# Patient Record
Sex: MALE | Race: BLACK OR AFRICAN AMERICAN
[De-identification: names, ages, dates, MRNs, and addresses within clinical notes are randomized per-mention and may not be internally consistent; named-entity substitution may affect disease eponyms.]

---

## 2017-12-10 ENCOUNTER — HOSPITAL ENCOUNTER (EMERGENCY)
Dept: HOSPITAL 53 - M ED | Age: 22
Discharge: HOME | End: 2017-12-10
Payer: OTHER GOVERNMENT

## 2017-12-10 VITALS — WEIGHT: 174.36 LBS | BODY MASS INDEX: 26.43 KG/M2 | HEIGHT: 68 IN

## 2017-12-10 VITALS — SYSTOLIC BLOOD PRESSURE: 133 MMHG | DIASTOLIC BLOOD PRESSURE: 77 MMHG

## 2017-12-10 DIAGNOSIS — M54.5: Primary | ICD-10-CM

## 2017-12-10 DIAGNOSIS — F17.210: ICD-10-CM

## 2017-12-10 LAB
ALBUMIN SERPL BCG-MCNC: 4 GM/DL (ref 3.2–5.2)
ALBUMIN/GLOB SERPL: 1.18 {RATIO} (ref 1–1.93)
ALP SERPL-CCNC: 88 U/L (ref 45–117)
ALT SERPL W P-5'-P-CCNC: 33 U/L (ref 12–78)
ANION GAP SERPL CALC-SCNC: 6 MEQ/L (ref 8–16)
AST SERPL-CCNC: 20 U/L (ref 7–37)
BASOPHILS # BLD AUTO: 0 10^3/UL (ref 0–0.2)
BASOPHILS NFR BLD AUTO: 0.3 % (ref 0–1)
BILIRUB CONJ SERPL-MCNC: 0.2 MG/DL (ref 0–0.2)
BILIRUB SERPL-MCNC: 0.9 MG/DL (ref 0.2–1)
BUN SERPL-MCNC: 14 MG/DL (ref 7–18)
CALCIUM SERPL-MCNC: 9.1 MG/DL (ref 8.5–10.1)
CHLORIDE SERPL-SCNC: 104 MEQ/L (ref 98–107)
CO2 SERPL-SCNC: 33 MEQ/L (ref 21–32)
CREAT SERPL-MCNC: 1.02 MG/DL (ref 0.7–1.3)
EOSINOPHIL # BLD AUTO: 0.1 10^3/UL (ref 0–0.5)
EOSINOPHIL NFR BLD AUTO: 2.1 % (ref 0–3)
ERYTHROCYTE [DISTWIDTH] IN BLOOD BY AUTOMATED COUNT: 13 % (ref 11.5–14.5)
GFR SERPL CREATININE-BSD FRML MDRD: > 60 ML/MIN/{1.73_M2} (ref 60–?)
GLUCOSE SERPL-MCNC: 101 MG/DL (ref 70–105)
IMM GRANULOCYTES NFR BLD: 0.2 % (ref 0–0)
LYMPHOCYTES # BLD AUTO: 2.7 10^3/UL (ref 1.5–6.5)
LYMPHOCYTES NFR BLD AUTO: 43 % (ref 24–44)
MCH RBC QN AUTO: 26.3 PG (ref 27–33)
MCHC RBC AUTO-ENTMCNC: 31.5 G/DL (ref 32–36.5)
MCV RBC AUTO: 83.4 FL (ref 80–96)
MONOCYTES # BLD AUTO: 0.4 10^3/UL (ref 0–0.8)
MONOCYTES NFR BLD AUTO: 7.1 % (ref 0–5)
NEUTROPHILS # BLD AUTO: 3 10^3/UL (ref 1.8–7.7)
NEUTROPHILS NFR BLD AUTO: 47.3 % (ref 36–66)
NRBC BLD AUTO-RTO: 0 % (ref 0–0)
PLATELET # BLD AUTO: 192 10^3/UL (ref 150–450)
POTASSIUM SERPL-SCNC: 4.2 MEQ/L (ref 3.5–5.1)
PROT SERPL-MCNC: 7.4 GM/DL (ref 6.4–8.2)
SODIUM SERPL-SCNC: 143 MEQ/L (ref 136–145)
SPECIFIC GRAVITY UR AUTO RFX: 1.01 (ref 1–1.03)
SQUAM EPITHELIAL CELL UR AURFX: 0 /HPF (ref 0–6)
WBC # BLD AUTO: 6.2 10^3/UL (ref 4–10)

## 2017-12-10 NOTE — REPUSA
CT of the abdomen and pelvis without contrast

Clinical statement: Pain.

Technique: Multiple axial CT images were obtained from the base of the lungs to the floor of the pelv
is utilizing 5 mm axial slices without administration of contrast. Coronal and sagittal reconstructio
ns were also obtained.

No comparison is available.

Findings:

Chest: The visualized lung bases are clear.

Abdomen: The kidneys are normal in size bilaterally. There is no evidence of hydronephrosis or nephro
lithiasis. The liver, spleen, pancreas, gallbladder and adrenal glands are unremarkable. The aorta de
monstrates normal caliber and contour. There is no abdominal lymphadenopathy or ascites.

Pelvis: The bowel is unremarkable, with no obstructive or inflammatory changes. The appendix is erasmo
l. The urinary bladder is within normal limits. There is no pelvic lymphadenopathy or ascites. The ot
her pelvic structures appear unremarkable.

Bones: There are no suspicious osseous abnormalities seen.

Impression: Unremarkable CT examination of the abdomen and pelvis.

     Electronically signed by EZEQUIEL NERI MD on 12/10/2017 09:13:51 PM ET

## 2017-12-28 ENCOUNTER — HOSPITAL ENCOUNTER (EMERGENCY)
Dept: HOSPITAL 53 - M ED | Age: 22
Discharge: HOME | End: 2017-12-28
Payer: OTHER GOVERNMENT

## 2017-12-28 DIAGNOSIS — K64.4: Primary | ICD-10-CM

## 2017-12-28 PROCEDURE — 99283 EMERGENCY DEPT VISIT LOW MDM: CPT

## 2017-12-28 RX ADMIN — LIDOCAINE AND PRILOCAINE 1 DOSE: 25; 25 CREAM TOPICAL at 02:15

## 2017-12-28 RX ADMIN — HYDROCODONE BITARTRATE AND ACETAMINOPHEN 1 TAB: 5; 325 TABLET ORAL at 02:27

## 2017-12-30 ENCOUNTER — HOSPITAL ENCOUNTER (EMERGENCY)
Dept: HOSPITAL 53 - M ED | Age: 22
Discharge: HOME | End: 2017-12-30
Payer: OTHER GOVERNMENT

## 2017-12-30 DIAGNOSIS — K64.4: Primary | ICD-10-CM

## 2017-12-30 PROCEDURE — 99282 EMERGENCY DEPT VISIT SF MDM: CPT

## 2017-12-30 RX ADMIN — HYDROCODONE BITARTRATE AND ACETAMINOPHEN 1 TAB: 5; 325 TABLET ORAL at 03:38

## 2018-01-24 ENCOUNTER — HOSPITAL ENCOUNTER (INPATIENT)
Dept: HOSPITAL 53 - M ED | Age: 23
LOS: 1 days | Discharge: HOME | DRG: 881 | End: 2018-01-25
Attending: PSYCHIATRY & NEUROLOGY | Admitting: PSYCHIATRY & NEUROLOGY
Payer: COMMERCIAL

## 2018-01-24 DIAGNOSIS — G47.00: ICD-10-CM

## 2018-01-24 DIAGNOSIS — F32.9: Primary | ICD-10-CM

## 2018-01-24 DIAGNOSIS — Z63.0: ICD-10-CM

## 2018-01-24 DIAGNOSIS — F17.210: ICD-10-CM

## 2018-01-24 LAB
ACETAMINOPHEN LEVEL: < 2 UG/ML (ref 10–30)
ALBUMIN/GLOBULIN RATIO: 1.4 (ref 1–1.93)
ALBUMIN: 4.2 GM/DL (ref 3.2–5.2)
ALKALINE PHOSPHATASE: 84 U/L (ref 45–117)
ALT SERPL W P-5'-P-CCNC: 33 U/L (ref 12–78)
AMPHETAMINES UR QL SCN: NEGATIVE
ANION GAP: 7 MEQ/L (ref 8–16)
AST SERPL-CCNC: 31 U/L (ref 7–37)
BARBITURATES UR QL SCN: NEGATIVE
BENZODIAZ UR QL SCN: NEGATIVE
BILIRUB CONJ SERPL-MCNC: 0.3 MG/DL (ref 0–0.2)
BILIRUBIN,TOTAL: 1.6 MG/DL (ref 0.2–1)
BLOOD UREA NITROGEN: 13 MG/DL (ref 7–18)
BZE UR QL SCN: NEGATIVE
CALCIUM LEVEL: 8.8 MG/DL (ref 8.5–10.1)
CANNABINOIDS UR QL SCN: NEGATIVE
CARBON DIOXIDE LEVEL: 29 MEQ/L (ref 21–32)
CHLORIDE LEVEL: 105 MEQ/L (ref 98–107)
CREATININE FOR GFR: 1.05 MG/DL (ref 0.7–1.3)
ETHYL ALCOHOL (ETHANOL): 0 % (ref 0–0.01)
GFR SERPL CREATININE-BSD FRML MDRD: > 60 ML/MIN/{1.73_M2} (ref 60–?)
GLUCOSE, FASTING: 98 MG/DL (ref 70–100)
HEMATOCRIT: 43.2 % (ref 42–52)
HEMOGLOBIN: 13.7 G/DL (ref 14–18)
MEAN CORPUSCULAR HEMOGLOBIN: 26.6 PG (ref 27–33)
MEAN CORPUSCULAR HGB CONC: 31.7 G/DL (ref 32–36.5)
MEAN CORPUSCULAR VOLUME: 83.7 FL (ref 80–96)
METHADONE URINE: NEGATIVE
NRBC BLD AUTO-RTO: 0 % (ref 0–0)
OPIATES UR QL SCN: NEGATIVE
PCP UR QL SCN: NEGATIVE
PLATELET COUNT, AUTOMATED: 178 10^3/UL (ref 150–450)
POTASSIUM SERUM: 4 MEQ/L (ref 3.5–5.1)
RED BLOOD COUNT: 5.16 10^6/UL (ref 4.3–6.1)
RED CELL DISTRIBUTION WIDTH: 13.2 % (ref 11.5–14.5)
SALICYLATE LEVEL: < 1.7 MG/DL (ref 5–30)
SODIUM LEVEL: 141 MEQ/L (ref 136–145)
THYROID STIMULATING HORMONE: 0.5 UIU/ML (ref 0.36–3.74)
TOTAL PROTEIN: 7.2 GM/DL (ref 6.4–8.2)
WHITE BLOOD COUNT: 6.1 10^3/UL (ref 4–10)

## 2018-01-24 RX ADMIN — ACETAMINOPHEN 1 MG: 325 TABLET ORAL at 18:20

## 2018-01-24 RX ADMIN — TRAZODONE HYDROCHLORIDE 1 MG: 50 TABLET ORAL at 22:54

## 2018-01-24 RX ADMIN — NICOTINE 1 PATCH: 14 PATCH, EXTENDED RELEASE TRANSDERMAL at 18:23

## 2018-01-24 SDOH — SOCIAL STABILITY - SOCIAL INSECURITY: PROBLEMS IN RELATIONSHIP WITH SPOUSE OR PARTNER: Z63.0

## 2018-01-25 RX ADMIN — NICOTINE 1 PATCH: 14 PATCH, EXTENDED RELEASE TRANSDERMAL at 08:20

## 2018-01-25 RX ADMIN — SERTRALINE HYDROCHLORIDE 1 MG: 50 TABLET ORAL at 08:20
